# Patient Record
Sex: FEMALE | URBAN - METROPOLITAN AREA
[De-identification: names, ages, dates, MRNs, and addresses within clinical notes are randomized per-mention and may not be internally consistent; named-entity substitution may affect disease eponyms.]

---

## 2017-02-27 ENCOUNTER — MEDICAL CORRESPONDENCE (OUTPATIENT)
Dept: HEALTH INFORMATION MANAGEMENT | Facility: CLINIC | Age: 22
End: 2017-02-27

## 2017-06-27 ENCOUNTER — OFFICE VISIT (OUTPATIENT)
Dept: DERMATOLOGY | Facility: CLINIC | Age: 22
End: 2017-06-27

## 2017-06-27 VITALS — HEART RATE: 69 BPM | SYSTOLIC BLOOD PRESSURE: 93 MMHG | DIASTOLIC BLOOD PRESSURE: 60 MMHG

## 2017-06-27 DIAGNOSIS — L63.9 ALOPECIA AREATA: Primary | ICD-10-CM

## 2017-06-27 DIAGNOSIS — M35.9 AUTOIMMUNE DISEASE (H): ICD-10-CM

## 2017-06-27 RX ORDER — DESOGESTREL AND ETHINYL ESTRADIOL 0.15-0.03
KIT ORAL
COMMUNITY

## 2017-06-27 ASSESSMENT — PAIN SCALES - GENERAL: PAINLEVEL: NO PAIN (0)

## 2017-06-27 NOTE — PATIENT INSTRUCTIONS
- Recommended shampoos (look for zinc and salicylic acid as ingredients) : T sal by [employer]rogena, Head and  Shoulders    -Benzoyl peroxidase face wash in morning, can buy in any drugstore

## 2017-06-27 NOTE — LETTER
"6/27/2017       RE: Mami Harley  [address] [place] [address]     Dear Colleague,    Thank you for referring your patient, Mami Harley, to the [facility] DERMATOLOGY at [facility]. Please see a copy of my visit note below.    [facility] Dermatology Note      Dermatology Problem List:  1.Alopecia universalis- Diagnosed 2016. Currently on tofacitinib 10 mg bid, increased from 5 mg bid on 3/10/2017. Past safety labs have been normal.   Past treatments per records: cyclosporine, methotrexate, prednisone, kenalog injections, and minoxidil    Encounter Date: Jun 27, 2017    CC:  Chief Complaint   Patient presents with     Hair Loss     Mami is here today for hair loss. Mami notes\" The hair loss started back in Jan. 2016 but I noticed it more in May. In Sept-Oct 2016 thats when I lost all of my the rest of my hair including my body hair.         History of Present Illness:  Ms. Mami Harley is a 22 year old female who was referred to our clinic from Dr. Kevin Cuevas, dermatologist in [place], for treatment recommendations for alopecia universalis. She began losing hair on her scalp in January 2016, which has since progressed to alopecia totalis and universalis since this past fall. Past treatments include cyclosporine, methotrexate, prednisone, intralesional kenalog injections and minoxidil without success. She was placed on tofacitinib 5mg bid in January 2017 and dose was increased to 10mg bid on March 10 2017. In the past few weeks she has noticed new hair growth on her scalp, eyebrows, eyelashes and rest of the body. She notes the regrowth hair fibers are fine and white. She has been tolerating the tofacitinib without problems, no headache, upper respiratory symptoms or urinary symptoms.  Safety labs have been normal to date.  No safety labs have been performed since the dose increase.     No identified triggers.    Past Medical History: " Otherwise healthy  There is no problem list on file for this patient.    History reviewed. No pertinent past medical history.  History reviewed. No pertinent surgical history.    Social History:  Just got associates degree. 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 No family history of autoimmune disease.     Medications:  Current Outpatient Prescriptions   Medication Sig Dispense Refill     tofacitinib (XELJANZ) 5 MG tablet        desogestrel-ethinyl estradiol (RECLIPSEN) 0.15-30 MG-MCG per tablet        Allergies   Allergen Reactions     Sulfa Drugs Rash         Review of Systems:  -Skin Establ Pt: The patient denies any new rash, pruritus, or lesions that are symptomatic, changing or bleeding, except as per HPI.  -Constitutional: The patient denies fatigue, fevers, chills, unintended weight loss, and night sweats.  -HEENT: Patient denies nonhealing oral sores.  -Skin: As above in HPI. No additional skin concerns.    Physical exam:  Vitals: BP 93/60  Pulse 69  GEN: This is a well developed, well-nourished female in no acute distress, in a pleasant mood.    SKIN: Focused examination of the scalp, face, and arms was performed.  -Scalp with diffuse hair growth  predominantly intermediate hair on scalp, mixture of vellus and terminal particularly on vertex  -Terminal hairs on eyelashes bilaterally  -Vellus hairs on eyebrows  -Vellus hairs on face and arms   -Scattered inflammatory acneiform papules on forehead  -Faint erythema on the scalp  -No other lesions of concern on areas examined.     Impression/Plan:  1. Alopecia universalis: Hair growth, predominantly indeterminate hairs, on scalp and vellus hairs on body after starting tofacitinib January 2017. Was started on 5 mg bid increased to 10 mg bid in March 2017 due to lack of response to 5mg. She is tolerating tofacitinib and safety labs have been normal. Given good  response to tofacitinib recommend continuing at 10 mg bid and continuing to follow with Dermatologist in [facility], Dr. Cuevas. Discussed possible course of alopecia universalis while on tofacitinib including potential for diffuse hair growth with occasional focal patches of alopecia. Discussed with patient and father other alopecia treatments on the market and in development. Additional or alternative therapies not recommended at this time due great response to tofacitinib    Continue xeljanz 10mg bid    Continue to follow with Dr. Cuevas    Head and shoulders or T sal shampoo    Benzoyl peroxide wash for face    2. Mild inflammatory acne on face- Recommend benzoyl peroxide wash      3. Seborrheic dermatitis: Recommend frequent shampooing with Neutragena T Sal or Head and Shoulders shampoo      CC Dr. Kevin Cuevas on close of this encounter.    Follow up as needed    Staff Involved:  Scribed by Allison Chávez MS4 for Dr. Joyce Copeland     I agree with the PFSH and ROS as completed by the Medical Student. The remainder of the encounter was performed by me and scribed by the Medical Student. The scribed note accurately reflects my personal services and the medical decisions made by me.      Patient was seen and examined with the medicine/dermatology resident. I agree with the history, review of systems, physical examination, assessments and plan.    Joyce Copeland MD  Professor and  Chair  Department of Dermatology  [facility]      Again, thank you for allowing me to participate in the care of your patient.      Sincerely,    Joyce Copeland MD

## 2017-06-27 NOTE — PROGRESS NOTES
"[facility] Dermatology Note      Dermatology Problem List:  1.Alopecia universalis- Diagnosed 2016. Currently on tofacitinib 10 mg bid, increased from 5 mg bid on 3/10/2017. Past safety labs have been normal.   Past treatments per records: cyclosporine, methotrexate, prednisone, kenalog injections, and minoxidil    Encounter Date: Jun 27, 2017    CC:  Chief Complaint   Patient presents with     Hair Loss     Mami is here today for hair loss. Mami notes\" The hair loss started back in Jan. 2016 but I noticed it more in May. In Sept-Oct 2016 thats when I lost all of my the rest of my hair including my body hair.         History of Present Illness:  Ms. Mami Harley is a 22 year old female who was referred to our clinic from Dr. Kevin Cuevas, dermatologist in [place], for treatment recommendations for alopecia universalis. She began losing hair on her scalp in January 2016, which has since progressed to alopecia totalis and universalis since this past fall. Past treatments include cyclosporine, methotrexate, prednisone, intralesional kenalog injections and minoxidil without success. She was placed on tofacitinib 5mg bid in January 2017 and dose was increased to 10mg bid on March 10 2017. In the past few weeks she has noticed new hair growth on her scalp, eyebrows, eyelashes and rest of the body. She notes the regrowth hair fibers are fine and white. She has been tolerating the tofacitinib without problems, no headache, upper respiratory symptoms or urinary symptoms.  Safety labs have been normal to date.  No safety labs have been performed since the dose increase.     No identified triggers.    Past Medical History: Otherwise healthy  There is no problem list on file for this patient.    History reviewed. No pertinent past medical history.  History reviewed. No pertinent surgical history.    Social History:  Just got associates degree. 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 No family history of autoimmune disease.     Medications:  Current Outpatient Prescriptions   Medication Sig Dispense Refill     tofacitinib (XELJANZ) 5 MG tablet        desogestrel-ethinyl estradiol (RECLIPSEN) 0.15-30 MG-MCG per tablet        Allergies   Allergen Reactions     Sulfa Drugs Rash         Review of Systems:  -Skin Establ Pt: The patient denies any new rash, pruritus, or lesions that are symptomatic, changing or bleeding, except as per HPI.  -Constitutional: The patient denies fatigue, fevers, chills, unintended weight loss, and night sweats.  -HEENT: Patient denies nonhealing oral sores.  -Skin: As above in HPI. No additional skin concerns.    Physical exam:  Vitals: BP 93/60  Pulse 69  GEN: This is a well developed, well-nourished female in no acute distress, in a pleasant mood.    SKIN: Focused examination of the scalp, face, and arms was performed.  -Scalp with diffuse hair growth  predominantly intermediate hair on scalp, mixture of vellus and terminal particularly on vertex  -Terminal hairs on eyelashes bilaterally  -Vellus hairs on eyebrows  -Vellus hairs on face and arms   -Scattered inflammatory acneiform papules on forehead  -Faint erythema on the scalp  -No other lesions of concern on areas examined.     Impression/Plan:  1. Alopecia universalis: Hair growth, predominantly indeterminate hairs, on scalp and vellus hairs on body after starting tofacitinib January 2017. Was started on 5 mg bid increased to 10 mg bid in March 2017 due to lack of response to 5mg. She is tolerating tofacitinib and safety labs have been normal. Given good response to tofacitinib recommend continuing at 10 mg bid and continuing to follow with Dermatologist in [facility], Dr. Cuevas. Discussed possible course of alopecia universalis while on tofacitinib including potential for diffuse hair growth with occasional focal patches of alopecia.  Discussed with patient and father other alopecia treatments on the market and in development. Additional or alternative therapies not recommended at this time due great response to tofacitinib    Continue xeljanz 10mg bid    Continue to follow with Dr. Cuevas    Head and shoulders or T sal shampoo    Benzoyl peroxide wash for face    2. Mild inflammatory acne on face- Recommend benzoyl peroxide wash      3. Seborrheic dermatitis: Recommend frequent shampooing with Neutragena T Sal or Head and Shoulders shampoo      CC Dr. Kevin Cuevas on close of this encounter.    Follow up as needed    Staff Involved:  Scribed by Allison Chávez MS4 for Dr. Joyce Copeland     I agree with the PFSH and ROS as completed by the Medical Student. The remainder of the encounter was performed by me and scribed by the Medical Student. The scribed note accurately reflects my personal services and the medical decisions made by me.      Patient was seen and examined with the medicine/dermatology resident. I agree with the history, review of systems, physical examination, assessments and plan.    Joyce Copeland MD  Professor and  Chair  Department of Dermatology  [facility]

## 2017-06-27 NOTE — NURSING NOTE
"Dermatology Rooming Note    Mami Harley's goals for this visit include:   Chief Complaint   Patient presents with     Hair Loss     Mami is here today for hair loss. Mami notes\" The hair loss started back in Jan. 2016 but I noticed it more in May. In Sept-Oct 2016 thats when I lost all of my the rest of my hair including my body hair.     Latisha Santos MA    "

## 2017-06-27 NOTE — LETTER
Date:July 17, 2017      Patient was self referred, no letter generated. Do not send.        [facility] Health Information

## 2017-06-27 NOTE — MR AVS SNAPSHOT
After Visit Summary   6/27/2017    Mami Harley    MRN: [id number]           Patient Information     Date Of Birth          1995        Visit Information        Provider Department      6/27/2017 2:15 PM Joyce Copeland MD [facility] Dermatology        Care Instructions    - Recommended shampoos (look for zinc and salicylic acid as ingredients) : T sal by Neutrogena, Head and  Shoulders    -Benzoyl peroxidase face wash in morning, can buy in any drugstore          Follow-ups after your visit        Who to contact     Please call your clinic at [phone] to:    Ask questions about your health    Make or cancel appointments    Discuss your medicines    Learn about your test results    Speak to your doctor   If you have compliments or concerns about an experience at your clinic, or if you wish to file a complaint, please contact [facility] Patient Relations at [phone] or email us at [email or web address]         Additional Information About Your Visit        MyChart Information     [employer] gives you secure access to your electronic health record. If you see a primary care provider, you can also send messages to your care team and make appointments. If you have questions, please call your primary care clinic.  If you do not have a primary care provider, please call [phone] and they will assist you.      [employer] is an electronic gateway that provides easy, online access to your medical records. With [employer], you can request a clinic appointment, read your test results, renew a prescription or communicate with your care team.     To access your existing account, please contact your [facility] or call [phone] for assistance.        Care EveryWhere ID     This is your Care EveryWhere ID. This could be used by other organizations to access your [facility] medical records  [id number]        Your Vitals Were      Pulse                   69            Blood Pressure from Last 3 Encounters:   06/27/17 93/60    Weight from Last 3 Encounters:   No data found for Wt              Today, you had the following     No orders found for display       Primary Care Provider    None Specified       No primary provider on file.        Equal Access to Services     OLGA ROWLEY : Micah cameron baxter gregory Clarke, wajonathonda luqadaha, qaybta kaalmada amilcar, ubaldo liang laMichaelrenato walls. So [facility] [phone].    ATENCIÓN: Si habla español, tiene a molina disposición servicios gratuitos de asistencia lingüística. Llame al [phone].    We comply with applicable federal civil rights laws and [place] laws. We do not discriminate on the basis of race, color, national origin, age, disability sex, sexual orientation or gender identity.            Thank you!     Thank you for choosing [facility] DERMATOLOGY  for your care. Our goal is always to provide you with excellent care. Hearing back from our patients is one way we can continue to improve our services. Please take a few minutes to complete the written survey that you may receive in the mail after your visit with us. Thank you!             Your Updated Medication List - Protect others around you: Learn how to safely use, store and throw away your medicines at www.disposemymeds.org.          This list is accurate as of: 6/27/17  3:19 PM.  Always use your most recent med list.                   Brand Name Dispense Instructions for use Diagnosis    RECLIPSEN 0.15-30 MG-MCG per tablet   Generic drug:  desogestrel-ethinyl estradiol           XELJANZ 5 MG tablet   Generic drug:  tofacitinib

## 2017-07-15 PROBLEM — L63.9 ALOPECIA AREATA: Status: ACTIVE | Noted: 2017-07-15

## 2017-07-15 PROBLEM — M35.9 AUTOIMMUNE DISEASE (H): Status: ACTIVE | Noted: 2017-07-15

## 2018-01-21 ENCOUNTER — HEALTH MAINTENANCE LETTER (OUTPATIENT)
Age: 23
End: 2018-01-21

## 2018-03-18 ENCOUNTER — HEALTH MAINTENANCE LETTER (OUTPATIENT)
Age: 23
End: 2018-03-18

## 2020-03-10 ENCOUNTER — HEALTH MAINTENANCE LETTER (OUTPATIENT)
Age: 25
End: 2020-03-10

## 2020-12-27 ENCOUNTER — HEALTH MAINTENANCE LETTER (OUTPATIENT)
Age: 25
End: 2020-12-27

## 2021-04-24 ENCOUNTER — HEALTH MAINTENANCE LETTER (OUTPATIENT)
Age: 26
End: 2021-04-24

## 2021-10-09 ENCOUNTER — HEALTH MAINTENANCE LETTER (OUTPATIENT)
Age: 26
End: 2021-10-09

## 2022-05-21 ENCOUNTER — HEALTH MAINTENANCE LETTER (OUTPATIENT)
Age: 27
End: 2022-05-21

## 2022-09-11 ENCOUNTER — HEALTH MAINTENANCE LETTER (OUTPATIENT)
Age: 27
End: 2022-09-11

## 2023-06-03 ENCOUNTER — HEALTH MAINTENANCE LETTER (OUTPATIENT)
Age: 28
End: 2023-06-03